# Patient Record
Sex: FEMALE | Race: WHITE
[De-identification: names, ages, dates, MRNs, and addresses within clinical notes are randomized per-mention and may not be internally consistent; named-entity substitution may affect disease eponyms.]

---

## 2018-08-23 ENCOUNTER — HOSPITAL ENCOUNTER (OUTPATIENT)
Dept: HOSPITAL 52 - LL.SDS | Age: 78
Discharge: HOME | End: 2018-08-23
Attending: SURGERY
Payer: MEDICARE

## 2018-08-23 DIAGNOSIS — N18.3: ICD-10-CM

## 2018-08-23 DIAGNOSIS — K57.30: ICD-10-CM

## 2018-08-23 DIAGNOSIS — D12.4: ICD-10-CM

## 2018-08-23 DIAGNOSIS — E78.00: ICD-10-CM

## 2018-08-23 DIAGNOSIS — Z12.11: Primary | ICD-10-CM

## 2018-08-23 DIAGNOSIS — Z79.82: ICD-10-CM

## 2018-08-23 DIAGNOSIS — T45.1X5A: ICD-10-CM

## 2018-08-23 DIAGNOSIS — Z86.010: ICD-10-CM

## 2018-08-23 DIAGNOSIS — Z79.899: ICD-10-CM

## 2018-08-23 DIAGNOSIS — C88.4: ICD-10-CM

## 2018-08-23 DIAGNOSIS — Z79.84: ICD-10-CM

## 2018-08-23 DIAGNOSIS — E11.22: ICD-10-CM

## 2018-08-23 DIAGNOSIS — K56.2: ICD-10-CM

## 2018-08-23 DIAGNOSIS — D70.1: ICD-10-CM

## 2018-08-23 DIAGNOSIS — Z80.0: ICD-10-CM

## 2018-08-23 DIAGNOSIS — D64.81: ICD-10-CM

## 2018-08-23 NOTE — PCM.OPNOTE
- General Post-Op/Procedure Note


Date of Surgery/Procedure: 08/23/18


Operative Procedure(s): Colonoscopy with polypectomy


Findings: 





Desc Colon polyps


Diverticulosis


Pre Op Diagnosis: FH Colon Ca; Hx Polyps


Post-Op Diagnosis: Same


Anesthesia Technique: MAC


Primary Surgeon: Thomas J Mohs


Anesthesia Provider: Baylee Moran


Pathology: 





polyp


EBL in mLs: 0


Complications: None


Condition: Good


Free Text/Narrative:: 


 Intake & Output











 08/22/18 08/23/18 08/23/18





 22:59 06:59 14:59


 


Intake Total   500


 


Balance   500

## 2018-08-23 NOTE — PCM.HPR
H & P Addendum review





- H & P Addendum Review


Date of Original H & P: 08/08/18


Date Reviewed: 08/23/18


Time Reviewed: 09:55


Patient was Examined: No Changes

## 2018-08-23 NOTE — OR
Date of Procedure:  08/23/2018

 

PREOPERATIVE DIAGNOSES:

1. Family history of colon cancer.

2. Personal history of colon polyps.

 

POSTOPERATIVE DIAGNOSES:

1. Descending colon polyp.

2. Diverticulosis.

 

PROCEDURE:  Colonoscopy.

 

ANESTHESIA:  IV sedation.

 

DESCRIPTION OF PROCEDURE:  The patient was brought to procedure room where she

was placed on her left side and IV sedation administered.  Digital rectal exam

was performed which was normal.  Colonoscope was inserted and advanced to the

level of the cecum with some difficulty getting through a tortuous colon,

requiring pressure on the abdomen.  The prep was good and surfaces were well

visualized.  Upon withdrawing the scope, the ascending, transverse, and

descending colon had a few scattered diverticula.  A small 6-mm sessile polyp

was located near the junction of the descending colon and sigmoid colon that was

removed with a cautery snare and retrieved in the polyp trap.  Sigmoid colon was

quite tortuous with multiple diverticula present.  Rectum was normal and

retroflexion was normal.  Air was removed and the scope withdrawn.  The patient

tolerated the procedure well returned to recovery in stable condition.

 

I will have the patient followup with JOLENE Reynolds, next week for review of

pathology report.  If the polyp was adenomatous, she should undergo repeat

colonoscopy again in 3 years.  If

polyp is hyperplastic, she could wait 5 years until her next colonoscopy.

 

MODL THOMAS J MOHS, MD

DD:  08/23/2018 10:51:20

DT:  08/23/2018 14:04:37

Job #:  [id number]

## 2024-09-05 ENCOUNTER — HOSPITAL ENCOUNTER (OUTPATIENT)
Dept: HOSPITAL 52 - LL.SDS | Age: 84
Discharge: HOME | End: 2024-09-05
Attending: SURGERY
Payer: MEDICARE

## 2024-09-05 VITALS — HEART RATE: 66 BPM | SYSTOLIC BLOOD PRESSURE: 147 MMHG | DIASTOLIC BLOOD PRESSURE: 68 MMHG

## 2024-09-05 DIAGNOSIS — Z80.0: ICD-10-CM

## 2024-09-05 DIAGNOSIS — Z86.010: ICD-10-CM

## 2024-09-05 DIAGNOSIS — Z12.11: Primary | ICD-10-CM

## 2024-09-05 DIAGNOSIS — E11.22: ICD-10-CM

## 2024-09-05 DIAGNOSIS — D12.3: ICD-10-CM

## 2024-09-05 DIAGNOSIS — N18.32: ICD-10-CM

## 2024-09-05 DIAGNOSIS — K57.30: ICD-10-CM

## 2024-09-05 LAB — GLUCOSE BLD-MCNC: 156 MG/DL (ref 70–99)
